# Patient Record
Sex: MALE | Race: WHITE | URBAN - METROPOLITAN AREA
[De-identification: names, ages, dates, MRNs, and addresses within clinical notes are randomized per-mention and may not be internally consistent; named-entity substitution may affect disease eponyms.]

---

## 2018-03-05 PROBLEM — K51.00 ULCERATIVE PANCOLITIS WITHOUT COMPLICATION (HCC): Status: ACTIVE | Noted: 2018-03-05

## 2018-03-05 PROBLEM — Z72.0 TOBACCO ABUSE: Status: ACTIVE | Noted: 2018-03-05

## 2018-03-05 PROBLEM — E05.90 HYPERTHYROIDISM: Status: ACTIVE | Noted: 2018-03-05

## 2018-03-05 PROBLEM — R59.1 LYMPHADENOPATHY: Status: ACTIVE | Noted: 2018-03-05

## 2018-03-05 PROBLEM — L05.91 PILONIDAL CYST: Status: ACTIVE | Noted: 2018-03-05

## 2018-03-28 ENCOUNTER — OFFICE VISIT (OUTPATIENT)
Dept: SURGERY | Age: 33
End: 2018-03-28
Payer: COMMERCIAL

## 2018-03-28 VITALS
OXYGEN SATURATION: 97 % | BODY MASS INDEX: 25.92 KG/M2 | HEART RATE: 82 BPM | DIASTOLIC BLOOD PRESSURE: 78 MMHG | HEIGHT: 69 IN | TEMPERATURE: 97.7 F | RESPIRATION RATE: 14 BRPM | WEIGHT: 175 LBS | SYSTOLIC BLOOD PRESSURE: 142 MMHG

## 2018-03-28 DIAGNOSIS — L05.91 PILONIDAL CYST: ICD-10-CM

## 2018-03-28 DIAGNOSIS — R59.1 LYMPHADENOPATHY: Primary | ICD-10-CM

## 2018-03-28 PROCEDURE — 99204 OFFICE O/P NEW MOD 45 MIN: CPT | Performed by: SURGERY

## 2018-03-28 ASSESSMENT — ENCOUNTER SYMPTOMS
COLOR CHANGE: 0
BLOOD IN STOOL: 0
CHOKING: 0
COUGH: 0
ABDOMINAL PAIN: 0
CONSTIPATION: 0
DIARRHEA: 0
BACK PAIN: 1
SHORTNESS OF BREATH: 0
VOMITING: 0
WHEEZING: 0
EYES NEGATIVE: 1
ABDOMINAL DISTENTION: 0
CHEST TIGHTNESS: 0
NAUSEA: 0
ANAL BLEEDING: 0

## 2018-03-28 NOTE — PATIENT INSTRUCTIONS
Call [phone] for any questions/concerns. Complete course of antibiotics as prescribed. Call for any increase in size/increase in pain/drainage/redness in area.

## 2018-03-28 NOTE — PROGRESS NOTES
Subjective:      Patient ID: Prince Hernandez is a 34 y.o. male. HPI  34 yr old male presents with complaint of pilonidal cyst on tailbone. States it has been there for over 10 years--occasionally drains foul smelling drainage. Has previously seen a surgeon about this. Has never had I&D in area. Also complains of swelling in left axilla. Has been present for about 2 months. Had intermittent pain in area--denies drainage from area; denies erythema of area. Was started on Keflex by PCP. Past Medical History:   Diagnosis Date    Colitis     Osteoarthritis        Past Surgical History:   Procedure Laterality Date    HERNIA REPAIR  1992 2000       Current Outpatient Prescriptions   Medication Sig Dispense Refill    cephALEXin (KEFLEX) 250 MG capsule Take 1 capsule by mouth 3 times daily 30 capsule 0     No current facility-administered medications for this visit. Allergies   Allergen Reactions    Chantix [Varenicline] Anaphylaxis       Family History   Problem Relation Age of Onset    No Known Problems Mother     High Blood Pressure Father     No Known Problems Brother     No Known Problems Brother        Social History     Social History    Marital status: Unknown     Spouse name: N/A    Number of children: N/A    Years of education: N/A     Occupational History    Not on file. Social History Main Topics    Smoking status: Current Every Day Smoker     Packs/day: 1.00     Types: Cigarettes     Start date: 1/2/2000    Smokeless tobacco: Never Used    Alcohol use Yes      Comment: weekends mostly 6 pack a day    Drug use: No    Sexual activity: Not on file     Other Topics Concern    Not on file     Social History Narrative    No narrative on file       Review of Systems   Constitutional: Negative for activity change, appetite change, chills, fever and unexpected weight change. HENT: Negative. Eyes: Negative.     Respiratory: Negative for cough, choking, chest tightness, sounds are normal. He exhibits no distension and no mass. There is no tenderness. There is no rebound and no guarding. Musculoskeletal: Normal range of motion. Lymphadenopathy:        Head (right side): No submental, no submandibular, no preauricular, no posterior auricular and no occipital adenopathy present. Head (left side): No submental, no submandibular, no preauricular, no posterior auricular and no occipital adenopathy present. Right cervical: No superficial cervical, no deep cervical and no posterior cervical adenopathy present. Left cervical: No superficial cervical, no deep cervical and no posterior cervical adenopathy present. He has axillary adenopathy. Right axillary: No pectoral and no lateral adenopathy present. Left axillary: Lateral adenopathy present. No pectoral adenopathy present. Right: No supraclavicular adenopathy present. Left: No supraclavicular adenopathy present. Left axillary adenopathy vs cyst--non-inflamed; minimally tender; no erythema; no drainage   Neurological: He is alert and oriented to person, place, and time. Skin: Skin is warm and dry. He is not diaphoretic. Psychiatric: He has a normal mood and affect.  His behavior is normal. Judgment and thought content normal.       Assessment:      Pilonidal sinus--not actively inflamed--discussed excision and packing of area--pt not interested in surgery  Left axillary adenopathy vs cyst      Plan:      Pt declines pilonidal surgery  Cont keflex until gone; return if increases in size or becomes more tender or starts to drain      Wilder MD Portia, FACS  3/28/2018  1:52 PM

## 2018-05-24 PROBLEM — F10.10 ALCOHOL ABUSE: Status: ACTIVE | Noted: 2018-05-24

## 2018-05-24 PROBLEM — L20.82 FLEXURAL ECZEMA: Status: ACTIVE | Noted: 2018-05-24

## 2018-05-24 PROBLEM — F41.9 ANXIETY: Status: ACTIVE | Noted: 2018-05-24
